# Patient Record
Sex: OTHER/UNKNOWN | Race: WHITE | NOT HISPANIC OR LATINO | Employment: UNEMPLOYED | URBAN - METROPOLITAN AREA
[De-identification: names, ages, dates, MRNs, and addresses within clinical notes are randomized per-mention and may not be internally consistent; named-entity substitution may affect disease eponyms.]

---

## 2024-01-24 DIAGNOSIS — M87.180 OSTEONECROSIS DUE TO DRUGS, JAW (H): Primary | ICD-10-CM

## 2024-02-15 NOTE — PRE-PROCEDURE
Oral and Maxillofacial Surgery (OMFS) Pre-Op Note    Surgery Date: 2/21/2024    Pre-op Dx: MRONJ    Planned Procedure: closure of OAC at left maxilla, debridement of left maxilla and bilateral mandible and extraction of teeth as indicated    Planned Anesthesia: GA w/ ETT    Allergies: NKDA    Pre-Operative Medications: Per anesthesia    Resident Surgeon:   Abel Polk DDS    Staff Surgeon:   Claudia Saucedo DDS    Risks and Complications discussed with patient/guardian/parents to include, but not limited to bleeding,  infection, swelling, pain, temporary/permanent hypoesthesia/paresthesia CN V3 mental  nerve/CN V2 maxillary nerve distribution as well as CN VII/facial nerve distribution, failure of treatment, need for additional  treatment/procedures. Consent will be written. All questions were answered.    Jaylene Haywood DDS   Oral and Maxillofacial surgery, intern   Pager: [phone]    --------------------------------------------------------------------------------     Consult note on 1/12/2024    ORAL & MAXILLOFACIAL SURGERY FOLLOW-UP:    S:  Jaylyn Tompkins is a 67 y/o female with history of IV Zometa for metastatic breast cancer (every 3 weeks 6680-0341, every 3 months 7050-1309) who presents for her follow-up for continued evaluation MRONJ of her left maxilla (site #11, buccal to teeth #14,15) and bilateral mandible (site #17, 18, site #32).  The exposed bone at site #11 has caused OAC that patient has been experiencing water and food coming out from her nose when drinking or eating.  Today patient reports that this the exposed bone at the right side at site #32 has been getting worse.  Reports dull pain 7/10 with shooting pain at all sites specially at the bilateral mandible that occur at least 3 times a week.  Reports constant foul taste.  Reports burning sensation at the left sinus when rinsing with Peridex due to the OAC.  Reports that the exposed bone and infection has affected her  life quality.  Reports numbness at left lip and chin has become wider.  At this point, she wants to move forward with surgical debridement in the operating room.      PHYSICAL EXAM:  GEN: WD/WN elderly female in a wheelchair, NAD  Neuro: AAOx4, CN V and CN VII intact. Except for V3 Mental nerve hypoesthesia     HEENT: NC/AT, EOMI, PERRL, no facial edema, induration or erythema, no abnormal skin lesions, inferior border of mandible is palpable bilaterally, neck soft with no palpable lymph nodes, JAYDEN 40 mm (3 fingers in), OP clear, uvula midline, fair oral hygiene, grossly adult dentition that is heavily restored, site #11 with exposed bone that caused OAC, extending from distal #10 to medial #12, the buccal surface of teeth #14, 15 with exposed bone extending to the vestibule, teeth #10, 12, 13, 14, 15 with class I mobility, tooth #14 with crown and the previously RCT treated, no vestibular edema, FOM ND/NT, site #17, 18 with exposed bone extending to the distal #19, at 32 with exposed bone.  All the areas of exposed bone with significant food impaction and erythematous tissue, somewhat with edema though no active drainage    IMAGINE   No imaging to review today    A: Jaylyn Tompkins is a 68-year-old female with history of IV Zometa for metastatic breast cancer (every 3 weeks 5274-4476, every 3 months 5449-3636) who presents with stage III MRONJ causing OAC at left maxilla (site #11, buccal to teeth #14,15) and Stage II MRONJ at bilateral posterior mandible (site #18,19 and site #32) with worsening exposed bone at left maxilla and no improvements in clinical symptoms at bilateral posterior mandible including shooting pain 7/10 indicated for surgical debridement.    PLAN  - Discussed findings, assessment and plan at length with patient and her  Will   - Plan for debridement and closure of OAC with buccal fat pad at the left maxilla (site #11, buccal to teeth #14,15) extraction of teeth as indicated, placement of  palatal splint as indicated, debridement at left posterior mandible (site #17,18) with placement of recon plate as indicated, and debridement of right posterior mandible (site #32)   - Plan for surgery in the OR under general anesthesia  - Surgery in the OR will be urgent within 1 month  - Patient wants to have surgery in early morning as her daily diabetic medication needs to be taken with food  - If possible patient wants to keep tooth #9 for speech.  Informed that we might end up extracting #10, 12, 13, 14, 15.  Patient is okay with that  - H&P provided by her oncologist prior to OR, within 1 month  - Palatal splint will be placed and kept in mouth for a few weeks after surgery to protect the OAC  - Antibiotic will be prescribed after surgery  - Dental soft food diet following surgery  - Continue with chlorhexidine rinse after surgery  - The numbness might get worse after surgery but she will experience improvements after that  - All questions were invited and answered    Next visit: Debridement and closure of OAC with buccal fat pad at left maxilla with extraction of teeth and placement of palatal splint as indicated, debridement at left posterior mandible with placement of recon plate as indicated, and debridement of right posterior mandible in the OR under general anesthesia      Jaylene Haywood DDS   OMFS intern       Findings, assessment and plan discussed with attending Dr. Claudia Willamspgaahansa     -----  CBCT on 12/15/2023

## 2024-02-19 ENCOUNTER — ANESTHESIA EVENT (OUTPATIENT)
Dept: SURGERY | Facility: CLINIC | Age: 69
End: 2024-02-19
Payer: COMMERCIAL

## 2024-02-19 NOTE — ANESTHESIA PREPROCEDURE EVALUATION
Anesthesia Pre-Procedure Evaluation    Patient: Jaylyn Tompkins   MRN: [id number] : 1955        Procedure : Procedure(s):  CLOSURE, FISTULA  left oroantral communication with buccal fat pad transfer, debridement of left maxilla, right mandible, left mandible          No past medical history on file.   No past surgical history on file.   Not on File   Social History     Tobacco Use    Smoking status: Not on file    Smokeless tobacco: Not on file   Substance Use Topics    Alcohol use: Not on file      Wt Readings from Last 1 Encounters:   No data found for Wt        Anesthesia Evaluation   Pt has had prior anesthetic. Type: General.    History of anesthetic complications (PONV, claustrophobia with masks)       ROS/MED HX  ENT/Pulmonary:    (-) tobacco use   Neurologic:  - neg neurologic ROS     Cardiovascular:     (+)  hypertension- -   -  - -                                      METS/Exercise Tolerance: 1 - Eating, dressing Comment: Limited by neuropathy. Slow mobility in short distances with walker/wheelchair   Hematologic:     (+) History of blood clots (S/p IV filter),               Musculoskeletal: Comment: Osteonecrosis of jaw      GI/Hepatic:     (+) GERD,                   Renal/Genitourinary:       Endo:     (+)  type II DM,                    Psychiatric/Substance Use:     (+) psychiatric history anxiety       Infectious Disease:       Malignancy:   (+) Malignancy (IV Zometa for metastatic breast cancer (every 3 weeks 8057-6265, every 3 months 7683-3584)), History of Breast.Breast CA status post Surgery and Chemo.      Other:            Physical Exam    Airway        Mallampati: II   TM distance: > 3 FB   Neck ROM: full   Mouth opening: > 3 cm    Respiratory Devices and Support         Dental     Comment: Patient reports no loose or chipped teeth. Osteonecrosis in left maxilla        Cardiovascular          Rhythm and rate: regular     Pulmonary                   OUTSIDE LABS:  CBC: No results found  "for: \"WBC\", \"HGB\", \"HCT\", \"PLT\"  BMP: No results found for: \"NA\", \"POTASSIUM\", \"CHLORIDE\", \"CO2\", \"BUN\", \"CR\", \"GLC\"  COAGS: No results found for: \"PTT\", \"INR\", \"FIBR\"  POC: No results found for: \"BGM\", \"HCG\", \"HCGS\"  HEPATIC: No results found for: \"ALBUMIN\", \"PROTTOTAL\", \"ALT\", \"AST\", \"GGT\", \"ALKPHOS\", \"BILITOTAL\", \"BILIDIRECT\", \"RONALDO\"  OTHER: No results found for: \"PH\", \"LACT\", \"A1C\", \"SILVESTRE\", \"PHOS\", \"MAG\", \"LIPASE\", \"AMYLASE\", \"TSH\", \"T4\", \"T3\", \"CRP\", \"SED\"    Anesthesia Plan    ASA Status:  3    NPO Status:  NPO Appropriate    Anesthesia Type: General.     - Airway: ETT   Induction: Intravenous, Propofol.   Maintenance: TIVA.   Techniques and Equipment:     - Airway: Fiberoptic Bronchoscope (elective)     - Lines/Monitors: BIS, Port in situ     Consents    Anesthesia Plan(s) and associated risks, benefits, and realistic alternatives discussed. Questions answered and patient/representative(s) expressed understanding.     - Discussed:     - Discussed with:  Patient      - Extended Intubation/Ventilatory Support Discussed: No.      - Patient is DNR/DNI Status: No     Use of blood products discussed: No .     Postoperative Care    Pain management: IV analgesics, Oral pain medications, Multi-modal analgesia.   PONV prophylaxis: Ondansetron (or other 5HT-3), Dexamethasone or Solumedrol     Comments:    Other Comments: Bedside echo with difficult windows. Biventricular function probably normal. No significant valvular abnormalities (AV, MV, TV) by visual assessment    Elective FOI.           Yasmany Barker MD    I have reviewed the pertinent notes and labs in the chart from the past 30 days and (re)examined the patient.  Any updates or changes from those notes are reflected in this note.                  "

## 2024-02-20 RX ORDER — METOPROLOL SUCCINATE 25 MG/1
25 TABLET, EXTENDED RELEASE ORAL DAILY
COMMUNITY

## 2024-02-20 RX ORDER — CETIRIZINE HYDROCHLORIDE 10 MG/1
10 TABLET ORAL DAILY
COMMUNITY

## 2024-02-20 RX ORDER — EMPAGLIFLOZIN, LINAGLIPTIN, METFORMIN HYDROCHLORIDE 10; 5; 1000 MG/1; MG/1; MG/1
TABLET, EXTENDED RELEASE ORAL DAILY
COMMUNITY

## 2024-02-20 ASSESSMENT — LIFESTYLE VARIABLES: TOBACCO_USE: 0

## 2024-02-21 ENCOUNTER — HOSPITAL ENCOUNTER (OUTPATIENT)
Facility: CLINIC | Age: 69
Discharge: HOME | End: 2024-02-21
Attending: DENTIST | Admitting: DENTIST
Payer: COMMERCIAL

## 2024-02-21 ENCOUNTER — ANESTHESIA (OUTPATIENT)
Dept: SURGERY | Facility: CLINIC | Age: 69
End: 2024-02-21
Payer: COMMERCIAL

## 2024-02-21 VITALS
DIASTOLIC BLOOD PRESSURE: 69 MMHG | HEIGHT: 70 IN | BODY MASS INDEX: 24.11 KG/M2 | SYSTOLIC BLOOD PRESSURE: 129 MMHG | TEMPERATURE: 98.1 F | WEIGHT: 168.43 LBS | RESPIRATION RATE: 12 BRPM | HEART RATE: 89 BPM | OXYGEN SATURATION: 100 %

## 2024-02-21 DIAGNOSIS — J32.0 OROANTRAL COMMUNICATION: Primary | ICD-10-CM

## 2024-02-21 LAB
GLUCOSE BLDC GLUCOMTR-MCNC: 156 MG/DL (ref 70–99)
GRAM STAIN RESULT: ABNORMAL

## 2024-02-21 PROCEDURE — 360N000076 HC SURGERY LEVEL 3, PER MIN: Performed by: DENTIST

## 2024-02-21 PROCEDURE — 250N000009 HC RX 250: Performed by: ANESTHESIOLOGY

## 2024-02-21 PROCEDURE — 87076 CULTURE ANAEROBE IDENT EACH: CPT | Performed by: DENTIST

## 2024-02-21 PROCEDURE — 87070 CULTURE OTHR SPECIMN AEROBIC: CPT | Performed by: DENTIST

## 2024-02-21 PROCEDURE — 87102 FUNGUS ISOLATION CULTURE: CPT | Performed by: DENTIST

## 2024-02-21 PROCEDURE — 82962 GLUCOSE BLOOD TEST: CPT

## 2024-02-21 PROCEDURE — 250N000011 HC RX IP 250 OP 636

## 2024-02-21 PROCEDURE — 258N000003 HC RX IP 258 OP 636

## 2024-02-21 PROCEDURE — 88305 TISSUE EXAM BY PATHOLOGIST: CPT | Mod: 26 | Performed by: STUDENT IN AN ORGANIZED HEALTH CARE EDUCATION/TRAINING PROGRAM

## 2024-02-21 PROCEDURE — 250N000013 HC RX MED GY IP 250 OP 250 PS 637

## 2024-02-21 PROCEDURE — 272N000001 HC OR GENERAL SUPPLY STERILE: Performed by: DENTIST

## 2024-02-21 PROCEDURE — 999N000141 HC STATISTIC PRE-PROCEDURE NURSING ASSESSMENT: Performed by: DENTIST

## 2024-02-21 PROCEDURE — 88305 TISSUE EXAM BY PATHOLOGIST: CPT | Mod: TC | Performed by: DENTIST

## 2024-02-21 PROCEDURE — 250N000011 HC RX IP 250 OP 636: Performed by: DENTIST

## 2024-02-21 PROCEDURE — 250N000009 HC RX 250: Performed by: DENTIST

## 2024-02-21 PROCEDURE — 370N000017 HC ANESTHESIA TECHNICAL FEE, PER MIN: Performed by: DENTIST

## 2024-02-21 PROCEDURE — 250N000025 HC SEVOFLURANE, PER MIN: Performed by: DENTIST

## 2024-02-21 PROCEDURE — 710N000010 HC RECOVERY PHASE 1, LEVEL 2, PER MIN: Performed by: DENTIST

## 2024-02-21 PROCEDURE — 710N000012 HC RECOVERY PHASE 2, PER MINUTE: Performed by: DENTIST

## 2024-02-21 PROCEDURE — 87205 SMEAR GRAM STAIN: CPT | Performed by: DENTIST

## 2024-02-21 PROCEDURE — 250N000011 HC RX IP 250 OP 636: Performed by: ANESTHESIOLOGY

## 2024-02-21 PROCEDURE — 250N000009 HC RX 250

## 2024-02-21 PROCEDURE — 30580 REPAIR UPPER JAW FISTULA: CPT | Performed by: ANESTHESIOLOGY

## 2024-02-21 RX ORDER — HYDROMORPHONE HCL IN WATER/PF 6 MG/30 ML
0.2 PATIENT CONTROLLED ANALGESIA SYRINGE INTRAVENOUS EVERY 5 MIN PRN
Status: DISCONTINUED | OUTPATIENT
Start: 2024-02-21 | End: 2024-02-21 | Stop reason: HOSPADM

## 2024-02-21 RX ORDER — CHLORHEXIDINE GLUCONATE ORAL RINSE 1.2 MG/ML
15 SOLUTION DENTAL 2 TIMES DAILY
Qty: 420 ML | Refills: 0 | Status: SHIPPED | OUTPATIENT
Start: 2024-02-21 | End: 2024-03-06

## 2024-02-21 RX ORDER — ONDANSETRON 2 MG/ML
INJECTION INTRAMUSCULAR; INTRAVENOUS PRN
Status: DISCONTINUED | OUTPATIENT
Start: 2024-02-21 | End: 2024-02-21

## 2024-02-21 RX ORDER — FENTANYL CITRATE 50 UG/ML
25 INJECTION, SOLUTION INTRAMUSCULAR; INTRAVENOUS EVERY 5 MIN PRN
Status: DISCONTINUED | OUTPATIENT
Start: 2024-02-21 | End: 2024-02-21 | Stop reason: HOSPADM

## 2024-02-21 RX ORDER — LABETALOL HYDROCHLORIDE 5 MG/ML
10 INJECTION, SOLUTION INTRAVENOUS
Status: DISCONTINUED | OUTPATIENT
Start: 2024-02-21 | End: 2024-02-21 | Stop reason: HOSPADM

## 2024-02-21 RX ORDER — PROPOFOL 10 MG/ML
INJECTION, EMULSION INTRAVENOUS PRN
Status: DISCONTINUED | OUTPATIENT
Start: 2024-02-21 | End: 2024-02-21

## 2024-02-21 RX ORDER — OXYCODONE HYDROCHLORIDE 10 MG/1
10 TABLET ORAL
Status: DISCONTINUED | OUTPATIENT
Start: 2024-02-21 | End: 2024-02-21 | Stop reason: HOSPADM

## 2024-02-21 RX ORDER — BUPIVACAINE HYDROCHLORIDE AND EPINEPHRINE 2.5; 5 MG/ML; UG/ML
INJECTION, SOLUTION INFILTRATION; PERINEURAL PRN
Status: DISCONTINUED | OUTPATIENT
Start: 2024-02-21 | End: 2024-02-21 | Stop reason: HOSPADM

## 2024-02-21 RX ORDER — ONDANSETRON 4 MG/1
4 TABLET, ORALLY DISINTEGRATING ORAL EVERY 30 MIN PRN
Status: DISCONTINUED | OUTPATIENT
Start: 2024-02-21 | End: 2024-02-21 | Stop reason: HOSPADM

## 2024-02-21 RX ORDER — DEXAMETHASONE SODIUM PHOSPHATE 4 MG/ML
INJECTION, SOLUTION INTRA-ARTICULAR; INTRALESIONAL; INTRAMUSCULAR; INTRAVENOUS; SOFT TISSUE PRN
Status: DISCONTINUED | OUTPATIENT
Start: 2024-02-21 | End: 2024-02-21

## 2024-02-21 RX ORDER — CEFAZOLIN SODIUM/WATER 2 G/20 ML
2 SYRINGE (ML) INTRAVENOUS SEE ADMIN INSTRUCTIONS
Status: DISCONTINUED | OUTPATIENT
Start: 2024-02-21 | End: 2024-02-21 | Stop reason: HOSPADM

## 2024-02-21 RX ORDER — ACETAMINOPHEN 500 MG
500 TABLET ORAL EVERY 6 HOURS PRN
Qty: 28 TABLET | Refills: 0 | Status: SHIPPED | OUTPATIENT
Start: 2024-02-21 | End: 2024-02-28

## 2024-02-21 RX ORDER — HEPARIN SODIUM (PORCINE) LOCK FLUSH IV SOLN 100 UNIT/ML 100 UNIT/ML
500 SOLUTION INTRAVENOUS ONCE
Status: COMPLETED | OUTPATIENT
Start: 2024-02-21 | End: 2024-02-21

## 2024-02-21 RX ORDER — CHLORHEXIDINE GLUCONATE ORAL RINSE 1.2 MG/ML
10 SOLUTION DENTAL ONCE
Status: COMPLETED | OUTPATIENT
Start: 2024-02-21 | End: 2024-02-21

## 2024-02-21 RX ORDER — DEXMEDETOMIDINE HYDROCHLORIDE 4 UG/ML
INJECTION, SOLUTION INTRAVENOUS PRN
Status: DISCONTINUED | OUTPATIENT
Start: 2024-02-21 | End: 2024-02-21

## 2024-02-21 RX ORDER — SODIUM CHLORIDE, SODIUM LACTATE, POTASSIUM CHLORIDE, CALCIUM CHLORIDE 600; 310; 30; 20 MG/100ML; MG/100ML; MG/100ML; MG/100ML
INJECTION, SOLUTION INTRAVENOUS CONTINUOUS
Status: DISCONTINUED | OUTPATIENT
Start: 2024-02-21 | End: 2024-02-21 | Stop reason: HOSPADM

## 2024-02-21 RX ORDER — OXYCODONE HYDROCHLORIDE 5 MG/1
5 TABLET ORAL
Status: COMPLETED | OUTPATIENT
Start: 2024-02-21 | End: 2024-02-21

## 2024-02-21 RX ORDER — IBUPROFEN 600 MG/1
600 TABLET, FILM COATED ORAL EVERY 6 HOURS PRN
Qty: 28 TABLET | Refills: 0 | Status: SHIPPED | OUTPATIENT
Start: 2024-02-21 | End: 2024-02-28

## 2024-02-21 RX ORDER — ACETAMINOPHEN 325 MG/1
975 TABLET ORAL ONCE
Status: COMPLETED | OUTPATIENT
Start: 2024-02-21 | End: 2024-02-21

## 2024-02-21 RX ORDER — ONDANSETRON 2 MG/ML
4 INJECTION INTRAMUSCULAR; INTRAVENOUS EVERY 30 MIN PRN
Status: DISCONTINUED | OUTPATIENT
Start: 2024-02-21 | End: 2024-02-21 | Stop reason: HOSPADM

## 2024-02-21 RX ORDER — FENTANYL CITRATE 50 UG/ML
50 INJECTION, SOLUTION INTRAMUSCULAR; INTRAVENOUS EVERY 5 MIN PRN
Status: DISCONTINUED | OUTPATIENT
Start: 2024-02-21 | End: 2024-02-21 | Stop reason: HOSPADM

## 2024-02-21 RX ORDER — OXYCODONE HYDROCHLORIDE 5 MG/1
5 TABLET ORAL EVERY 6 HOURS PRN
Qty: 12 TABLET | Refills: 0 | Status: SHIPPED | OUTPATIENT
Start: 2024-02-21 | End: 2024-02-24

## 2024-02-21 RX ORDER — SODIUM CHLORIDE, SODIUM LACTATE, POTASSIUM CHLORIDE, CALCIUM CHLORIDE 600; 310; 30; 20 MG/100ML; MG/100ML; MG/100ML; MG/100ML
INJECTION, SOLUTION INTRAVENOUS CONTINUOUS PRN
Status: DISCONTINUED | OUTPATIENT
Start: 2024-02-21 | End: 2024-02-21

## 2024-02-21 RX ORDER — FENTANYL CITRATE 50 UG/ML
INJECTION, SOLUTION INTRAMUSCULAR; INTRAVENOUS PRN
Status: DISCONTINUED | OUTPATIENT
Start: 2024-02-21 | End: 2024-02-21

## 2024-02-21 RX ORDER — HYDRALAZINE HYDROCHLORIDE 20 MG/ML
2.5-5 INJECTION INTRAMUSCULAR; INTRAVENOUS EVERY 10 MIN PRN
Status: DISCONTINUED | OUTPATIENT
Start: 2024-02-21 | End: 2024-02-21 | Stop reason: HOSPADM

## 2024-02-21 RX ORDER — ACETAMINOPHEN 325 MG/1
975 TABLET ORAL ONCE
Status: DISCONTINUED | OUTPATIENT
Start: 2024-02-21 | End: 2024-02-21 | Stop reason: HOSPADM

## 2024-02-21 RX ORDER — HYDROMORPHONE HCL IN WATER/PF 6 MG/30 ML
0.4 PATIENT CONTROLLED ANALGESIA SYRINGE INTRAVENOUS EVERY 5 MIN PRN
Status: DISCONTINUED | OUTPATIENT
Start: 2024-02-21 | End: 2024-02-21 | Stop reason: HOSPADM

## 2024-02-21 RX ORDER — LIDOCAINE 40 MG/G
CREAM TOPICAL
Status: DISCONTINUED | OUTPATIENT
Start: 2024-02-21 | End: 2024-02-21 | Stop reason: HOSPADM

## 2024-02-21 RX ORDER — NALOXONE HYDROCHLORIDE 0.4 MG/ML
0.1 INJECTION, SOLUTION INTRAMUSCULAR; INTRAVENOUS; SUBCUTANEOUS
Status: DISCONTINUED | OUTPATIENT
Start: 2024-02-21 | End: 2024-02-21 | Stop reason: HOSPADM

## 2024-02-21 RX ORDER — PROPOFOL 10 MG/ML
INJECTION, EMULSION INTRAVENOUS CONTINUOUS PRN
Status: DISCONTINUED | OUTPATIENT
Start: 2024-02-21 | End: 2024-02-21

## 2024-02-21 RX ORDER — CEFAZOLIN SODIUM/WATER 2 G/20 ML
2 SYRINGE (ML) INTRAVENOUS
Status: COMPLETED | OUTPATIENT
Start: 2024-02-21 | End: 2024-02-21

## 2024-02-21 RX ADMIN — PROPOFOL 75 MG: 10 INJECTION, EMULSION INTRAVENOUS at 08:03

## 2024-02-21 RX ADMIN — FENTANYL CITRATE 25 MCG: 50 INJECTION, SOLUTION INTRAMUSCULAR; INTRAVENOUS at 10:25

## 2024-02-21 RX ADMIN — HEPARIN SODIUM (PORCINE) LOCK FLUSH IV SOLN 100 UNIT/ML 500 UNITS: 100 SOLUTION at 12:54

## 2024-02-21 RX ADMIN — CHLORHEXIDINE GLUCONATE 0.12% ORAL RINSE 10 ML: 1.2 LIQUID ORAL at 06:50

## 2024-02-21 RX ADMIN — ONDANSETRON 4 MG: 2 INJECTION INTRAMUSCULAR; INTRAVENOUS at 09:31

## 2024-02-21 RX ADMIN — DEXAMETHASONE SODIUM PHOSPHATE 4 MG: 4 INJECTION, SOLUTION INTRA-ARTICULAR; INTRALESIONAL; INTRAMUSCULAR; INTRAVENOUS; SOFT TISSUE at 08:21

## 2024-02-21 RX ADMIN — SUGAMMADEX 50 MG: 100 INJECTION, SOLUTION INTRAVENOUS at 09:32

## 2024-02-21 RX ADMIN — FENTANYL CITRATE 25 MCG: 50 INJECTION, SOLUTION INTRAMUSCULAR; INTRAVENOUS at 10:32

## 2024-02-21 RX ADMIN — ACETAMINOPHEN 975 MG: 325 TABLET, FILM COATED ORAL at 06:49

## 2024-02-21 RX ADMIN — FENTANYL CITRATE 50 MCG: 50 INJECTION INTRAMUSCULAR; INTRAVENOUS at 09:54

## 2024-02-21 RX ADMIN — Medication 2 G: at 08:09

## 2024-02-21 RX ADMIN — SODIUM CHLORIDE, POTASSIUM CHLORIDE, SODIUM LACTATE AND CALCIUM CHLORIDE: 600; 310; 30; 20 INJECTION, SOLUTION INTRAVENOUS at 07:56

## 2024-02-21 RX ADMIN — FENTANYL CITRATE 25 MCG: 50 INJECTION INTRAMUSCULAR; INTRAVENOUS at 10:01

## 2024-02-21 RX ADMIN — PROPOFOL 80 MCG/KG/MIN: 10 INJECTION, EMULSION INTRAVENOUS at 08:16

## 2024-02-21 RX ADMIN — DEXMEDETOMIDINE 8 MCG: 100 INJECTION, SOLUTION, CONCENTRATE INTRAVENOUS at 08:45

## 2024-02-21 RX ADMIN — OXYCODONE HYDROCHLORIDE 5 MG: 5 TABLET ORAL at 10:55

## 2024-02-21 RX ADMIN — Medication 50 MG: at 08:04

## 2024-02-21 ASSESSMENT — ACTIVITIES OF DAILY LIVING (ADL)
ADLS_ACUITY_SCORE: 23
ADLS_ACUITY_SCORE: 22
ADLS_ACUITY_SCORE: 23
ADLS_ACUITY_SCORE: 20

## 2024-02-21 NOTE — OP NOTE
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]     Postoperative Diagnosis  Same as preop     Procedure(s):  Closure of left oroantral communication with buccal fat pad transfer Debridement of left maxilla and bilateral mandible  Simple extraction of erupted teeth #10,12, 13, 14, 15  Surgical extraction of erupted tooth #31      Surgeon:  Claudia Saucedo DDS, DON       Resident Surgeon(s):  TIMMY Mahoney DMD     EBL:   40 mL     Drains: None     Prosthetic Devices:   * No implants in log *     Specimen Removed:   ID Type Source Tests Collected by Time Destination   1 : left maxilla Tissue Maxilla SURGICAL PATHOLOGY EXAM Claudia Saucedo DDS 2/21/2024  8:27 AM    2 : Left mandible Tissue Mandible SURGICAL PATHOLOGY EXAM Claudia Saucedo DDS 2/21/2024  9:26 AM    3 : Right mandible Tissue Mandible SURGICAL PATHOLOGY EXAM Claudia Saucedo DDS 2/21/2024  9:30 AM    A : left maxilla Tissue Maxilla ANAEROBIC BACTERIAL CULTURE ROUTINE, GRAM STAIN, FUNGAL OR YEAST CULTURE ROUTINE, AEROBIC BACTERIAL CULTURE ROUTINE Claudia Saucedo DDS 2/21/2024  8:33 AM         Complications: none     Indications for Surgery:   Jaylyn Tompkins is a 69 year old unknown with pmh of metastatic breast cancer and IV Zometa use from 9911-1515 who developed osteonecrosis of the maxilla and mandible. The MRONJ increased to include teeth #10,12,13,14,15, and 31 as well as creating a large oroantral communication at the left maxilla. Closure of the communication with buccal fat pad transfer and extraction of the affected teeth was recommended to the patient. The risks, benefits, alternatives including but not limited to no treatment, bleeding, bruising, swelling, infection, damage to adjacent structures, need for additional procedures were discussed with the patient preoperatively.     Procedure description:   On the day  of surgery, the patient was seen by myself and Dr. Saucedo in the pre-op holding area.  The procedure, benefits, risks, alternatives including no treatment were discussed again with the patient and an informed written consent was obtained for the planned procedure.  Patient was transported to the operating room and transferred to the OR table in a supine position.  Airway was secured via oral tube  by the anesthesia team. A throat pack was placed and the mouth was prepped with chlorhexidine. 10ml 0.25% marcaine w/ epi was administered. The patient was prepped and draped in a sterile fashion for the planned procedure A surgical timeout was performed by all members of the team.     A periosteal elevator was used to release gingival attachements at teeth #10,12,13,14,15 and a mucoperiosteal flap was reflected buccally. A combination of dental elevators and forceps were then used to extract teeth #10,12,13,14,15. Rongeurs were used to remove necrotic bone from the left maxilla. This bone was sent for cultures and permanent histopathology. Curettes were then used to remove affected schneiderian membrane and granulation tissue from the left maxillary sinus. The site was copiously irrigated. 15 blade and scissors were used for periosteal release.  Cherry forceps were used to dissect through periosteum and release the buccal fat pad. The buccal fat pad did not cover the entirety of the defect but was sutured to the palate with 4-0 vicryl to cover the anterior-posterior length of the communication. The buccal mucosal flap was then secured to the palatal tissue for closure wit 4-0 vicryl suture in interrupted horizontal mattress and continuous horizontal mattress fashion. A premade maxillary splint was then secured to the maxilla at teeth #3,6,9 using 26 gauge wire.    Attention turned to the left mandible. Rongeurs and curettes were used to remove exposed necrotic bone at the left posterior mandible. These bone fragments  were sent for permanent histopathology    Attention turned the right mandible. Rongeurs and curettes were used to remove exposed necrotic bone at the right posterior mandible. These bone fragments were sent for permanent histopathology. The necrotic bone involved the roots of tooth #30 and the decision was made to extract this tooth. A combination of dental elevators and forceps were used to extract the distal root of #30. A conservative envelope flap was obtained with a periosteal elevator. A fissure bur with copious irrigation was used for a buccal trough at the mesial root #30 which was then extracted using a dental elevator. The site was smoothed with a bone file. The mandible sites were copiously irrigated.    The throat pack was removed and gastric contents suctioned. The patient was turned over to the anesthesia service. The patient was extubated and transferred to PACU in stable condition.    I was told by the OR nurse staff that all needles, sponges, instruments were found to be correct and there were no intraoperative complications noted.     Attending staff was present for the entire duration of the procedure.     Abel Brock DDS  Oral & Maxillofacial Surgery PGY 4

## 2024-02-21 NOTE — DISCHARGE INSTRUCTIONS
Contacting your Doctor -   To contact a doctor, call Dr Saucedo  [phone] [CLINIC] or:  [phone] and ask for the resident on call for oral surgery  (answered 24 hours a day)   Emergency Department:  [facility]: [phone]  [facility]: [phone] 911 if you are in need of immediate or emergent help         HOME CARE INSTRUCTIONS FOLLOWING SURGERY    CARE OF THE MOUTH    1. WOUND CARE: Do not disturb the wound. Do not rinse your mouth or use a mouthwash for the day of surgery. If you smoke, please refrain from doing so for at least 4 days. Avoid probing the wound. A waterpick should not be used during the early healing period. The above activities will cause complications with wound healing.     2. SWELLING: Facial swelling occurs following most dental extractions and oral surgery procedures. To help minimize swelling, apply an ice pack to the face for 20 minutes; remove for 20 minutes; alternating back and forth throughout the first day after surgery. To be most effective, the applications of ice packs should begin as soon as possible.   The maximum facial swelling normally occurs on days 2-5 following surgery. Once present, it can remain swollen for 7-10 days after surgery.     3. PAIN: A variable amount of pain follows most extractions or oral surgical procedures. If you are given a prescription for pain medication please use as directed. Many times analgesics are prescribed on a scheduled basis. Excessive or increased pain after the third day following surgery is not normal. Should this occur, please call the clinic and set up a follow up appointment if not already scheduled.     4. BLEEDING: A slight amount of bleeding or oozing is expected up to 24 hours after surgery. Theses conditions are no cause for alarm. Following your oral surgery, a small sterile gauze compress may be placed on the wound and we ask that you maintain steady biting pressure on the gauze for 1 hour.      5.  NAUSEA: Nausea is not an uncommon event after surgery, and it is sometimes cause by narcotic pain medication. Nausea may be reduce by taking pills with food or water. Attempt to keep drinking small amounts of clear fluids if you find the nausea does not improve. Cola drinks with less carbonation may help with nausea.     6. DISCOLORATION: Facial discoloration (black and blue bruising) often follows many extraction and oral surgery procedures. Discoloration is normal and is no cause for alarm. It may persist for several weeks.     7. JAW STIFFNESS: For several days following most oral procedures, the jaw may become somewhat stiff. Should jaw stiffness progress or persist after one week, notify you oral surgeon.     8. DIET: Soft diet must be followed for 7 days. It is extremely important to maintain adequate hydration for normal healing. Examples of soft foods include: masked potatoes, soups, applesauce, jell-o, ice cream, overcooked pasta.     Please use the Internet to look up liquid diets to help with ideas     9. MOUTH RINSES: The day following surgery begin using warm salt water rinses after meals and several times during the day as directed by your oral surgeon. One-half teaspoon of table salt in a full glass of warm water is recommended.       10. PHYSICAL ACTIVITY/REST: Keep physical activity to a minimum. Avoid athletic and strenuous activities and get plenty of rest.    11. STITCHES: Following many extractions and oral surgery procedures, stiches as placed in the gums. Your doctor will advise you if a return appointment is needed for stitch removal.    12. SINUS PRECAUTIONS: No nose-blowing, using a straw, or sneezing with your mouth closed for 4-6 weeks.     13. BRUSHING: Resume your normal oral hygiene routine within 24 hours following surgery. Soreness ans swelling may not permit vigorous brushing of all areas, but please make every effort to clean your teeth within comfort.     14. NUMBNESS: Local  anesthetics may be effective for as long as 24-48 hours. Should you experience numbness beyone this period, notify your oral surgeon.       AFTER HOURS CONTACT FOR EMERGENCIES:  Please call the [facility] switchboard at [phone] and ask to have the Oral and Maxillofacial Surgery Resident On-call paged.     It is our desire to make your recovery as smooth as possible. These instructions are given to assist you following your surgery. If you have any questions please call the clinic at [phone].

## 2024-02-21 NOTE — ANESTHESIA CARE TRANSFER NOTE
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    Anesthesia Type:   General     Note:    Oropharynx: oropharynx clear of all foreign objects and spontaneously breathing  Level of Consciousness: awake  Oxygen Supplementation: nasal cannula  Level of Supplemental Oxygen (L/min / FiO2): 3  Independent Airway: airway patency satisfactory and stable  Dentition: dentition unchanged  Vital Signs Stable: post-procedure vital signs reviewed and stable  Report to RN Given: handoff report given  Patient transferred to: PACU    Handoff Report: Identifed the Patient, Identified the Reponsible Provider, Reviewed the pertinent medical history, Discussed the surgical course, Reviewed Intra-OP anesthesia mangement and issues during anesthesia, Set expectations for post-procedure period and Allowed opportunity for questions and acknowledgement of understanding      Vitals:  Vitals Value Taken Time   /62 02/21/24 1130   Temp 36.1  C (97  F) 02/21/24 1100   Pulse 89 02/21/24 1130   Resp 23 02/21/24 1121   SpO2 96 % 02/21/24 1130   Vitals shown include unfiled device data.    Electronically Signed By: Yasmany Barker MD  February 21, 2024  11:33 AM

## 2024-02-21 NOTE — OR NURSING
Report to ALEX Reyes RN Hong was able to get up with assistance stating he was a little stronger. Denies nausea.

## 2024-02-21 NOTE — BRIEF OP NOTE
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]  Post-operative diagnosis Same as pre-operative diagnosis    Procedure: Fistula Closure of left oroantral communication with buccal fat pad transfer, Debridement of left maxilla and bilateral mandible, Extraction of 10,12, 13, 14, 15, and 31, Bilateral - Mouth    Surgeon: Surgeon(s) and Role:     * Claudia Saucedo DDS - Primary     * Abel Brock DDS - Resident - Assisting  Anesthesia: General   Estimated Blood Loss: 40    Drains: None  Specimens:   ID Type Source Tests Collected by Time Destination   1 : left maxilla Tissue Maxilla SURGICAL PATHOLOGY EXAM Claudia Saucedo DDS 2/21/2024  8:27 AM    2 : Left mandible Tissue Mandible SURGICAL PATHOLOGY EXAM Claudia Saucedo DDS 2/21/2024  9:26 AM    3 : Right mandible Tissue Mandible SURGICAL PATHOLOGY EXAM Claudia Saucedo DDS 2/21/2024  9:30 AM    A : left maxilla Tissue Maxilla ANAEROBIC BACTERIAL CULTURE ROUTINE, GRAM STAIN, FUNGAL OR YEAST CULTURE ROUTINE, AEROBIC BACTERIAL CULTURE ROUTINE Claudia Saucedo DDS 2/21/2024  8:33 AM      Findings:   Severe osteonecrosis of the left maxilla, bilateral mandible. 1.5x3cm left oroantral communication.  Complications: None.  Implants: * No implants in log *

## 2024-02-21 NOTE — OR NURSING
Discharge instructions to pt and responsible adult.-   spouse          All questions regarding discharge information answered.  Pt and responsible adult verbalized understanding of all discharge instruction information given.  Printed copy of AVS sent with pt upon discharge.

## 2024-02-21 NOTE — ANESTHESIA PROCEDURE NOTES
Airway       Patient location during procedure: OR       Procedure Start/Stop Times: 2/21/2024 8:08 AM  Staff -        Anesthesiologist:  Arpan An MD       Resident/Fellow: Yasmany Barker MD       Performed By: anesthesiologist and residentIndications and Patient Condition       Indications for airway management: heber-procedural and airway protection       Induction type:intravenous       Mask difficulty assessment: 1 - vent by mask    Final Airway Details       Final airway type: endotracheal airway       Successful airway: ETT - single  Endotracheal Airway Details        ETT size (mm): 6.5       Cuffed: yes       Successful intubation technique: flexible bronchoscopy (elective)       Grade View of Cords: 1       Placement verification comments: (Direct visualization of ghulam distal to ETT tip by bronchoscopy)       Position: Right       Measured from: gums/teeth       Secured at (cm): 24       Bite block used: None (OMFS to do perfor procedure in mouth)    Post intubation assessment        Placement verified by: capnometry and chest rise        Number of attempts at approach: 1       Ease of procedure: easy       Dentition: Intact and Unchanged    Medication(s) Administered   Medication Administration Time: 2/21/2024 8:08 AM    Additional Comments       Elective fiberoptic intubation for teaching purposes.

## 2024-02-21 NOTE — ANESTHESIA POSTPROCEDURE EVALUATION
Patient: Jaylyn Tompkins    Procedure: Procedure(s):  Fistula Closure of left oroantral communication with buccal fat pad transfer, Debridement of left maxilla and bilateral mandible, Extraction of 10,12, 13, 14, 15, and 31       Anesthesia Type:  General    Note:  Disposition: Outpatient   Postop Pain Control: Uneventful            Sign Out: Well controlled pain   PONV: No   Neuro/Psych: Uneventful            Sign Out: Acceptable/Baseline neuro status   Airway/Respiratory: Uneventful            Sign Out: Acceptable/Baseline resp. status   CV/Hemodynamics: Uneventful            Sign Out: Acceptable CV status; No obvious hypovolemia; No obvious fluid overload   Other NRE: NONE   DID A NON-ROUTINE EVENT OCCUR? No           Last vitals:  Vitals Value Taken Time   /62 02/21/24 1130   Temp 36.1  C (97  F) 02/21/24 1100   Pulse 89 02/21/24 1130   Resp 16 02/21/24 1130   SpO2 96 % 02/21/24 1130       Electronically Signed By: Arpan An MD  February 21, 2024  2:01 PM

## 2024-02-24 LAB
BACTERIA TISS BX CULT: ABNORMAL

## 2024-03-04 LAB
PATH REPORT.COMMENTS IMP SPEC: NORMAL
PATH REPORT.COMMENTS IMP SPEC: NORMAL
PATH REPORT.FINAL DX SPEC: NORMAL
PATH REPORT.GROSS SPEC: NORMAL
PATH REPORT.MICROSCOPIC SPEC OTHER STN: NORMAL
PATH REPORT.RELEVANT HX SPEC: NORMAL
PHOTO IMAGE: NORMAL

## 2024-03-20 LAB — BACTERIA TISS BX CULT: NO GROWTH

## (undated) DEVICE — SOL NACL 0.9% IRRIG 1000ML BOTTLE 2F7124

## (undated) DEVICE — ESU GROUND PAD ADULT W/CORD E7507

## (undated) DEVICE — SU CHROMIC 3-0 FS-2 27" 636

## (undated) DEVICE — TAPE CLOTH ADHESIVE 3" LATEX 3554C

## (undated) DEVICE — SUCTION MANIFOLD NEPTUNE 2 SYS 4 PORT 0702-020-000

## (undated) DEVICE — DRSG GAUZE 4X4" TRAY 6939

## (undated) DEVICE — SUCTION TIP YANKAUER W/O VENT K86

## (undated) DEVICE — SPONGE SURGIFOAM 100 1974

## (undated) DEVICE — LINEN TOWEL PACK X6 WHITE 5487

## (undated) DEVICE — RX SURGIFLO HEMOSTATIC MATRIX W/THROMBIN 8ML 2994

## (undated) DEVICE — SU VICRYL 3-0 SH 27" UND J416H

## (undated) DEVICE — ESU NDL COLORADO MICRO E1651

## (undated) DEVICE — PREP POVIDONE-IODINE 7.5% SCRUB 4OZ BOTTLE MDS093945

## (undated) DEVICE — DRSG TELFA 3X8" 1238

## (undated) DEVICE — LINEN TOWEL PACK X5 5464

## (undated) DEVICE — BUR STRK SIDE CUT 1.6X5.1X44.8MM CARBIDE 6FLUTE 1607-002-107

## (undated) DEVICE — BUR STRK EGG 4.0X44.5MM 10 FLUTE 1607-002-035

## (undated) DEVICE — PREP SKIN SCRUB TRAY 4461A

## (undated) DEVICE — PREP POVIDONE-IODINE 10% SOLUTION 4OZ BOTTLE MDS093944

## (undated) DEVICE — SOL WATER IRRIG 1000ML BOTTLE 2F7114

## (undated) DEVICE — TOOTHBRUSH ADULT NON STERILE MDS136850

## (undated) DEVICE — APPLICATORS COTTON TIP 6"X2 STERILE LF C15053-006

## (undated) DEVICE — SYRINGE EAR/ULCER STERILE 2 OZ BULB 4172

## (undated) DEVICE — PACK NEURO MINOR UMMC SNE32MNMU4

## (undated) RX ORDER — CHLORHEXIDINE GLUCONATE ORAL RINSE 1.2 MG/ML
SOLUTION DENTAL
Status: DISPENSED
Start: 2024-02-21

## (undated) RX ORDER — BUPIVACAINE HYDROCHLORIDE AND EPINEPHRINE 2.5; 5 MG/ML; UG/ML
INJECTION, SOLUTION EPIDURAL; INFILTRATION; INTRACAUDAL; PERINEURAL
Status: DISPENSED
Start: 2024-02-21

## (undated) RX ORDER — OXYCODONE HYDROCHLORIDE 5 MG/1
TABLET ORAL
Status: DISPENSED
Start: 2024-02-21

## (undated) RX ORDER — ACETAMINOPHEN 325 MG/1
TABLET ORAL
Status: DISPENSED
Start: 2024-02-21

## (undated) RX ORDER — CEFAZOLIN SODIUM/WATER 2 G/20 ML
SYRINGE (ML) INTRAVENOUS
Status: DISPENSED
Start: 2024-02-21

## (undated) RX ORDER — FENTANYL CITRATE 50 UG/ML
INJECTION, SOLUTION INTRAMUSCULAR; INTRAVENOUS
Status: DISPENSED
Start: 2024-02-21